# Patient Record
Sex: MALE | Race: BLACK OR AFRICAN AMERICAN
[De-identification: names, ages, dates, MRNs, and addresses within clinical notes are randomized per-mention and may not be internally consistent; named-entity substitution may affect disease eponyms.]

---

## 2022-12-15 ENCOUNTER — RX ONLY (RX ONLY)
Age: 62
End: 2022-12-15

## 2022-12-15 ENCOUNTER — OFFICE (OUTPATIENT)
Dept: URBAN - METROPOLITAN AREA CLINIC 102 | Facility: CLINIC | Age: 62
Setting detail: OPHTHALMOLOGY
End: 2022-12-15
Payer: COMMERCIAL

## 2022-12-15 DIAGNOSIS — H00.11: ICD-10-CM

## 2022-12-15 DIAGNOSIS — H25.13: ICD-10-CM

## 2022-12-15 DIAGNOSIS — E11.3491: ICD-10-CM

## 2022-12-15 DIAGNOSIS — E11.3412: ICD-10-CM

## 2022-12-15 PROCEDURE — 92134 CPTRZ OPH DX IMG PST SGM RTA: CPT | Performed by: OPHTHALMOLOGY

## 2022-12-15 PROCEDURE — 92014 COMPRE OPH EXAM EST PT 1/>: CPT | Performed by: OPHTHALMOLOGY

## 2022-12-15 ASSESSMENT — REFRACTION_MANIFEST
OD_AXIS: 092
OS_SPHERE: +1.00
OD_CYLINDER: -2.00
OD_SPHERE: +1.00
OD_AXIS: 90
OD_SPHERE: +1.25
OD_VA1: 20/25-2
OS_CYLINDER: -1.75
OD_CYLINDER: -2.00
OS_SPHERE: +1.25
OS_VA1: 20/30-1
OS_AXIS: 088
OS_CYLINDER: -1.50
OS_VA1: 20/40-1
OS_AXIS: 85

## 2022-12-15 ASSESSMENT — REFRACTION_CURRENTRX
OD_CYLINDER: 0.00
OD_SPHERE: +1.50
OS_VPRISM_DIRECTION: SV
OS_SPHERE: +1.50
OD_AXIS: 180
OS_OVR_VA: 20/
OD_VPRISM_DIRECTION: SV
OD_OVR_VA: 20/
OS_CYLINDER: 0.00
OS_AXIS: 180

## 2022-12-15 ASSESSMENT — VISUAL ACUITY
OD_BCVA: 20/60-2
OS_BCVA: 20/50-1

## 2022-12-15 ASSESSMENT — AXIALLENGTH_DERIVED
OS_AL: 23.5891
OS_AL: 23.4439
OD_AL: 23.455
OS_AL: 23.4439
OD_AL: 23.3591
OD_AL: 23.3591

## 2022-12-15 ASSESSMENT — REFRACTION_AUTOREFRACTION
OS_AXIS: 088
OD_SPHERE: +1.25
OS_CYLINDER: -1.50
OS_SPHERE: +1.25
OD_AXIS: 092
OD_CYLINDER: -2.00

## 2022-12-15 ASSESSMENT — SPHEQUIV_DERIVED
OS_SPHEQUIV: 0.5
OD_SPHEQUIV: 0.25
OS_SPHEQUIV: 0.5
OD_SPHEQUIV: 0
OS_SPHEQUIV: 0.125
OD_SPHEQUIV: 0.25

## 2022-12-15 ASSESSMENT — KERATOMETRY
METHOD_AUTO_MANUAL: AUTO
OD_AXISANGLE_DEGREES: 008
OS_K2POWER_DIOPTERS: 44.00
OD_K2POWER_DIOPTERS: 44.75
OD_K1POWER_DIOPTERS: 43.00
OS_AXISANGLE_DEGREES: 179
OS_K1POWER_DIOPTERS: 42.75

## 2022-12-15 ASSESSMENT — CONFRONTATIONAL VISUAL FIELD TEST (CVF)
OS_FINDINGS: FULL
OD_FINDINGS: FULL

## 2022-12-15 ASSESSMENT — TONOMETRY
OD_IOP_MMHG: 16
OS_IOP_MMHG: 19

## 2023-04-19 ENCOUNTER — EMERGENCY (EMERGENCY)
Facility: HOSPITAL | Age: 63
LOS: 0 days | Discharge: HOME | End: 2023-04-19
Attending: EMERGENCY MEDICINE
Payer: COMMERCIAL

## 2023-04-19 VITALS — WEIGHT: 177.91 LBS | HEIGHT: 65 IN

## 2023-04-19 VITALS — SYSTOLIC BLOOD PRESSURE: 158 MMHG | DIASTOLIC BLOOD PRESSURE: 103 MMHG

## 2023-04-19 DIAGNOSIS — M54.50 LOW BACK PAIN, UNSPECIFIED: ICD-10-CM

## 2023-04-19 DIAGNOSIS — E11.9 TYPE 2 DIABETES MELLITUS WITHOUT COMPLICATIONS: ICD-10-CM

## 2023-04-19 DIAGNOSIS — I10 ESSENTIAL (PRIMARY) HYPERTENSION: ICD-10-CM

## 2023-04-19 DIAGNOSIS — M62.830 MUSCLE SPASM OF BACK: ICD-10-CM

## 2023-04-19 PROCEDURE — 96372 THER/PROPH/DIAG INJ SC/IM: CPT

## 2023-04-19 PROCEDURE — 99284 EMERGENCY DEPT VISIT MOD MDM: CPT

## 2023-04-19 PROCEDURE — 99283 EMERGENCY DEPT VISIT LOW MDM: CPT | Mod: 25

## 2023-04-19 RX ORDER — LIDOCAINE 4 G/100G
1 CREAM TOPICAL ONCE
Refills: 0 | Status: COMPLETED | OUTPATIENT
Start: 2023-04-19 | End: 2023-04-19

## 2023-04-19 RX ORDER — KETOROLAC TROMETHAMINE 30 MG/ML
30 SYRINGE (ML) INJECTION ONCE
Refills: 0 | Status: DISCONTINUED | OUTPATIENT
Start: 2023-04-19 | End: 2023-04-19

## 2023-04-19 RX ORDER — LIDOCAINE 4 G/100G
1 CREAM TOPICAL
Qty: 2 | Refills: 0
Start: 2023-04-19

## 2023-04-19 RX ORDER — CYCLOBENZAPRINE HYDROCHLORIDE 10 MG/1
1 TABLET, FILM COATED ORAL
Qty: 30 | Refills: 0
Start: 2023-04-19

## 2023-04-19 RX ORDER — CYCLOBENZAPRINE HYDROCHLORIDE 10 MG/1
10 TABLET, FILM COATED ORAL ONCE
Refills: 0 | Status: COMPLETED | OUTPATIENT
Start: 2023-04-19 | End: 2023-04-19

## 2023-04-19 RX ADMIN — LIDOCAINE 1 PATCH: 4 CREAM TOPICAL at 21:59

## 2023-04-19 RX ADMIN — CYCLOBENZAPRINE HYDROCHLORIDE 10 MILLIGRAM(S): 10 TABLET, FILM COATED ORAL at 22:01

## 2023-04-19 RX ADMIN — Medication 30 MILLIGRAM(S): at 22:02

## 2023-04-19 NOTE — ED STATDOCS - CLINICAL SUMMARY MEDICAL DECISION MAKING FREE TEXT BOX
Pt with atraumatic bilateral lower back pain, positional. Neuro intact. No red flags. Plan: pain control and muscle relaxers. Stable for outpatient PCP follow-up.

## 2023-04-19 NOTE — ED ADULT TRIAGE NOTE - CHIEF COMPLAINT QUOTE
Ambulatory to ER with c/o lower back pain x 2 days. Patient denies injury to area pain meds taken prior to arrival with no relief.

## 2023-04-19 NOTE — ED STATDOCS - PHYSICAL EXAMINATION
General: AAOx3, NAD  HEENT: NCAT  Cardiac: Normal rate and rhythm, no murmurs, normal peripheral perfusion  Respiratory: Normal rate and effort. CTAB  GI: Soft, nondistended, nontender  Neuro: No focal deficits. BARRIENTOS equally x4, sensation to light touch intact throughout  MSK: Normal straight leg raise and FROMx4, no focal bony tenderness, no peripheral edema  Skin: No rash

## 2023-04-19 NOTE — ED STATDOCS - OBJECTIVE STATEMENT
62 year old male with PMHx of DM and HTN presents to the ED complaining of bilateral lower back pain x 2-3 days. Pain exacerbated with standing and movement. Denies pain radiation down his legs, incontinence, numbness, weakness, tingling, saddle anesthesia, or any other complaints. Denies any trauma or recent heavy lifting, only endorses mowing the lawn recently. Took extra strength Excedrin today.

## 2023-04-19 NOTE — ED STATDOCS - PATIENT PORTAL LINK FT
You can access the FollowMyHealth Patient Portal offered by [facility] by registering at the following website: [email or web address]. By joining [employer]’s FollowMyHealth portal, you will also be able to view your health information using other applications (apps) compatible with our system.

## 2023-04-19 NOTE — ED STATDOCS - NSFOLLOWUPINSTRUCTIONS_ED_ALL_ED_FT
Return to the Emergency Department for worsening or persistent symptoms, and/or ANY NEW OR CONCERNING SYMPTOMS. If you have issues obtaining follow up, please call: [phone] or [phone]  to obtain a doctor or specialist who takes your insurance in your area.    Acute Back Pain, Adult  Acute back pain is sudden and usually short-lived. It is often caused by an injury to the muscles and tissues in the back. The injury may result from:  A muscle, tendon, or ligament getting overstretched or torn. Ligaments are tissues that connect bones to each other. Lifting something improperly can cause a back strain.  Wear and tear (degeneration) of the spinal disks. Spinal disks are circular tissue that provide cushioning between the bones of the spine (vertebrae).  Twisting motions, such as while playing sports or doing yard work.  A hit to the back.  Arthritis.  You may have a physical exam, lab tests, and imaging tests to find the cause of your pain. Acute back pain usually goes away with rest and home care.    Follow these instructions at home:  Managing pain, stiffness, and swelling    Take over-the-counter and prescription medicines only as told by your health care provider. Treatment may include medicines for pain and inflammation that are taken by mouth or applied to the skin, or muscle relaxants.  Your health care provider may recommend applying ice during the first 24–48 hours after your pain starts. To do this:  Put ice in a plastic bag.  Place a towel between your skin and the bag.  Leave the ice on for 20 minutes, 2–3 times a day.  Remove the ice if your skin turns bright red. This is very important. If you cannot feel pain, heat, or cold, you have a greater risk of damage to the area.  If directed, apply heat to the affected area as often as told by your health care provider. Use the heat source that your health care provider recommends, such as a moist heat pack or a heating pad.  Place a towel between your skin and the heat source.  Leave the heat on for 20–30 minutes.  Remove the heat if your skin turns bright red. This is especially important if you are unable to feel pain, heat, or cold. You have a greater risk of getting burned.  Activity    Comparisons of good and bad posture while driving, standing, sitting at a desk, and lifting heavy objects.  Do not stay in bed. Staying in bed for more than 1–2 days can delay your recovery.  Sit up and stand up straight. Avoid leaning forward when you sit or hunching over when you stand.  If you work at a desk, sit close to it so you do not need to lean over. Keep your chin tucked in. Keep your neck drawn back, and keep your elbows bent at a 90-degree angle (right angle).  Sit high and close to the steering wheel when you drive. Add lower back (lumbar) support to your car seat, if needed.  Take short walks on even surfaces as soon as you are able. Try to increase the length of time you walk each day.  Do not sit, drive, or  one place for more than 30 minutes at a time. Sitting or standing for long periods of time can put stress on your back.  Do not drive or use heavy machinery while taking prescription pain medicine.  Use proper lifting techniques. When you bend and lift, use positions that put less stress on your back:  Bend your knees.  Keep the load close to your body.  Avoid twisting.  Exercise regularly as told by your health care provider. Exercising helps your back heal faster and helps prevent back injuries by keeping muscles strong and flexible.  Work with a physical therapist to make a safe exercise program, as recommended by your health care provider. Do any exercises as told by your physical therapist.  Lifestyle    Maintain a healthy weight. Extra weight puts stress on your back and makes it difficult to have good posture.  Avoid activities or situations that make you feel anxious or stressed. Stress and anxiety increase muscle tension and can make back pain worse. Learn ways to manage anxiety and stress, such as through exercise.  General instructions    Sleep on a firm mattress in a comfortable position. Try lying on your side with your knees slightly bent. If you lie on your back, put a pillow under your knees.  Keep your head and neck in a straight line with your spine (neutral position) when using electronic equipment like smartphones or pads. To do this:  Raise your smartphone or pad to look at it instead of bending your head or neck to look down.  Put the smartphone or pad at the level of your face while looking at the screen.  Follow your treatment plan as told by your health care provider. This may include:  Cognitive or behavioral therapy.  Acupuncture or massage therapy.  Meditation or yoga.  Contact a health care provider if:  You have pain that is not relieved with rest or medicine.  You have increasing pain going down into your legs or buttocks.  Your pain does not improve after 2 weeks.  You have pain at night.  You lose weight without trying.  You have a fever or chills.  You develop nausea or vomiting.  You develop abdominal pain.  Get help right away if:  You develop new bowel or bladder control problems.  You have unusual weakness or numbness in your arms or legs.  You feel faint.  These symptoms may represent a serious problem that is an emergency. Do not wait to see if the symptoms will go away. Get medical help right away. Call your local emergency services (911 in the [place] Do not drive yourself to the hospital.    Summary  Acute back pain is sudden and usually short-lived.  Use proper lifting techniques. When you bend and lift, use positions that put less stress on your back.  Take over-the-counter and prescription medicines only as told by your health care provider, and apply heat or ice as told.  This information is not intended to replace advice given to you by your health care provider. Make sure you discuss any questions you have with your health care provider.

## 2023-04-19 NOTE — ED ADULT NURSE NOTE - OBJECTIVE STATEMENT
61 y/o male awake alert and oriented x4 presents to ED c/o b/l lower back pain x 2-3 days. Pt worse with standing and movement. Denies pain radiating down his legs, numbness or tingling sensation. Denies any trauma or heavy lifting.

## 2023-04-24 ENCOUNTER — EMERGENCY (EMERGENCY)
Facility: HOSPITAL | Age: 63
LOS: 0 days | Discharge: HOME | End: 2023-04-24
Attending: EMERGENCY MEDICINE
Payer: COMMERCIAL

## 2023-04-24 VITALS
SYSTOLIC BLOOD PRESSURE: 157 MMHG | DIASTOLIC BLOOD PRESSURE: 82 MMHG | OXYGEN SATURATION: 98 % | WEIGHT: 177.91 LBS | TEMPERATURE: 98 F | HEART RATE: 73 BPM | RESPIRATION RATE: 18 BRPM | HEIGHT: 65 IN

## 2023-04-24 VITALS
OXYGEN SATURATION: 100 % | TEMPERATURE: 99 F | RESPIRATION RATE: 16 BRPM | DIASTOLIC BLOOD PRESSURE: 90 MMHG | HEART RATE: 72 BPM | SYSTOLIC BLOOD PRESSURE: 154 MMHG

## 2023-04-24 DIAGNOSIS — M25.552 PAIN IN LEFT HIP: ICD-10-CM

## 2023-04-24 DIAGNOSIS — R10.9 UNSPECIFIED ABDOMINAL PAIN: ICD-10-CM

## 2023-04-24 DIAGNOSIS — R29.91 UNSPECIFIED SYMPTOMS AND SIGNS INVOLVING THE MUSCULOSKELETAL SYSTEM: ICD-10-CM
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
## 2023-04-24 DIAGNOSIS — M54.50 LOW BACK PAIN, UNSPECIFIED: ICD-10-CM
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
## 2023-04-24 DIAGNOSIS — Z79.84 LONG TERM (CURRENT) USE OF ORAL HYPOGLYCEMIC DRUGS: ICD-10-CM

## 2023-04-24 DIAGNOSIS — M25.551 PAIN IN RIGHT HIP: ICD-10-CM
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
## 2023-04-24 DIAGNOSIS — S32.10XA UNSPECIFIED FRACTURE OF SACRUM, INITIAL ENCOUNTER FOR CLOSED FRACTURE: ICD-10-CM

## 2023-04-24 LAB
ALBUMIN SERPL ELPH-MCNC: 3.6 G/DL — SIGNIFICANT CHANGE UP (ref 3.3–5)
ALP SERPL-CCNC: 97 U/L — SIGNIFICANT CHANGE UP (ref 40–120)
ALT FLD-CCNC: 30 U/L — SIGNIFICANT CHANGE UP (ref 12–78)
APPEARANCE UR: CLEAR — SIGNIFICANT CHANGE UP
AST SERPL-CCNC: 18 U/L — SIGNIFICANT CHANGE UP (ref 15–37)
BACTERIA # UR AUTO: NEGATIVE — SIGNIFICANT CHANGE UP
BASOPHILS # BLD AUTO: 0.04 K/UL — SIGNIFICANT CHANGE UP (ref 0–0.2)
BASOPHILS NFR BLD AUTO: 0.8 % — SIGNIFICANT CHANGE UP (ref 0–2)
BILIRUB SERPL-MCNC: 0.6 MG/DL — SIGNIFICANT CHANGE UP (ref 0.2–1.2)
BILIRUB UR-MCNC: NEGATIVE — SIGNIFICANT CHANGE UP
BUN SERPL-MCNC: 15 MG/DL — SIGNIFICANT CHANGE UP (ref 7–23)
CALCIUM SERPL-MCNC: 8.8 MG/DL — SIGNIFICANT CHANGE UP (ref 8.5–10.1)
CHLORIDE SERPL-SCNC: 106 MMOL/L — SIGNIFICANT CHANGE UP (ref 96–108)
CO2 SERPL-SCNC: 33 MMOL/L — HIGH (ref 22–31)
COLOR SPEC: YELLOW — SIGNIFICANT CHANGE UP
CREAT SERPL-MCNC: 0.96 MG/DL — SIGNIFICANT CHANGE UP (ref 0.5–1.3)
CRP SERPL-MCNC: <3 MG/L — SIGNIFICANT CHANGE UP
DIFF PNL FLD: ABNORMAL
EGFR: 90 ML/MIN/1.73M2 — SIGNIFICANT CHANGE UP
EOSINOPHIL # BLD AUTO: 0.09 K/UL — SIGNIFICANT CHANGE UP (ref 0–0.5)
EOSINOPHIL NFR BLD AUTO: 1.7 % — SIGNIFICANT CHANGE UP (ref 0–6)
EPI CELLS # UR: SIGNIFICANT CHANGE UP
ERYTHROCYTE [SEDIMENTATION RATE] IN BLOOD: 8 MM/HR — SIGNIFICANT CHANGE UP (ref 0–20)
GLUCOSE SERPL-MCNC: 252 MG/DL — HIGH (ref 70–99)
GLUCOSE UR QL: 1000 MG/DL
HCT VFR BLD CALC: 42.6 % — SIGNIFICANT CHANGE UP (ref 39–50)
HGB BLD-MCNC: 13.7 G/DL — SIGNIFICANT CHANGE UP (ref 13–17)
IMM GRANULOCYTES NFR BLD AUTO: 0.2 % — SIGNIFICANT CHANGE UP (ref 0–0.9)
KETONES UR-MCNC: ABNORMAL
LEUKOCYTE ESTERASE UR-ACNC: NEGATIVE — SIGNIFICANT CHANGE UP
LYMPHOCYTES # BLD AUTO: 1.74 K/UL — SIGNIFICANT CHANGE UP (ref 1–3.3)
LYMPHOCYTES # BLD AUTO: 32.6 % — SIGNIFICANT CHANGE UP (ref 13–44)
MCHC RBC-ENTMCNC: 28.5 PG — SIGNIFICANT CHANGE UP (ref 27–34)
MCHC RBC-ENTMCNC: 32.2 GM/DL — SIGNIFICANT CHANGE UP (ref 32–36)
MCV RBC AUTO: 88.6 FL — SIGNIFICANT CHANGE UP (ref 80–100)
MONOCYTES # BLD AUTO: 0.53 K/UL — SIGNIFICANT CHANGE UP (ref 0–0.9)
MONOCYTES NFR BLD AUTO: 9.9 % — SIGNIFICANT CHANGE UP (ref 2–14)
NEUTROPHILS # BLD AUTO: 2.92 K/UL — SIGNIFICANT CHANGE UP (ref 1.8–7.4)
NEUTROPHILS NFR BLD AUTO: 54.8 % — SIGNIFICANT CHANGE UP (ref 43–77)
NITRITE UR-MCNC: NEGATIVE — SIGNIFICANT CHANGE UP
PH UR: 5 — SIGNIFICANT CHANGE UP (ref 5–8)
PLATELET # BLD AUTO: 212 K/UL — SIGNIFICANT CHANGE UP (ref 150–400)
POTASSIUM SERPL-MCNC: 4.1 MMOL/L — SIGNIFICANT CHANGE UP (ref 3.5–5.3)
POTASSIUM SERPL-SCNC: 4.1 MMOL/L — SIGNIFICANT CHANGE UP (ref 3.5–5.3)
PROT SERPL-MCNC: 8 GM/DL — SIGNIFICANT CHANGE UP (ref 6–8.3)
PROT UR-MCNC: 15
RBC # BLD: 4.81 M/UL — SIGNIFICANT CHANGE UP (ref 4.2–5.8)
RBC # FLD: 13 % — SIGNIFICANT CHANGE UP (ref 10.3–14.5)
RBC CASTS # UR COMP ASSIST: SIGNIFICANT CHANGE UP /HPF (ref 0–4)
SODIUM SERPL-SCNC: 138 MMOL/L — SIGNIFICANT CHANGE UP (ref 135–145)
SP GR SPEC: 1.02 — SIGNIFICANT CHANGE UP (ref 1.01–1.02)
UROBILINOGEN FLD QL: NEGATIVE — SIGNIFICANT CHANGE UP
WBC # BLD: 5.33 K/UL — SIGNIFICANT CHANGE UP (ref 3.8–10.5)
WBC # FLD AUTO: 5.33 K/UL — SIGNIFICANT CHANGE UP (ref 3.8–10.5)
WBC UR QL: SIGNIFICANT CHANGE UP /HPF (ref 0–5)

## 2023-04-24 PROCEDURE — 81001 URINALYSIS AUTO W/SCOPE: CPT

## 2023-04-24 PROCEDURE — 85652 RBC SED RATE AUTOMATED: CPT

## 2023-04-24 PROCEDURE — 85025 COMPLETE CBC W/AUTO DIFF WBC: CPT

## 2023-04-24 PROCEDURE — 74177 CT ABD & PELVIS W/CONTRAST: CPT | Mod: ME

## 2023-04-24 PROCEDURE — 86140 C-REACTIVE PROTEIN: CPT

## 2023-04-24 PROCEDURE — G1004: CPT

## 2023-04-24 PROCEDURE — 99284 EMERGENCY DEPT VISIT MOD MDM: CPT

## 2023-04-24 PROCEDURE — 80053 COMPREHEN METABOLIC PANEL: CPT

## 2023-04-24 PROCEDURE — 36415 COLL VENOUS BLD VENIPUNCTURE: CPT

## 2023-04-24 PROCEDURE — 96374 THER/PROPH/DIAG INJ IV PUSH: CPT | Mod: XU

## 2023-04-24 PROCEDURE — 99284 EMERGENCY DEPT VISIT MOD MDM: CPT | Mod: 25

## 2023-04-24 PROCEDURE — 71250 CT THORAX DX C-: CPT | Mod: 26,MG

## 2023-04-24 PROCEDURE — 74177 CT ABD & PELVIS W/CONTRAST: CPT | Mod: 26,ME

## 2023-04-24 PROCEDURE — 96361 HYDRATE IV INFUSION ADD-ON: CPT

## 2023-04-24 PROCEDURE — 71250 CT THORAX DX C-: CPT | Mod: MG

## 2023-04-24 RX ORDER — KETOROLAC TROMETHAMINE 30 MG/ML
30 SYRINGE (ML) INJECTION ONCE
Refills: 0 | Status: DISCONTINUED | OUTPATIENT
Start: 2023-04-24 | End: 2023-04-24

## 2023-04-24 RX ORDER — SODIUM CHLORIDE 9 MG/ML
1000 INJECTION INTRAMUSCULAR; INTRAVENOUS; SUBCUTANEOUS ONCE
Refills: 0 | Status: COMPLETED | OUTPATIENT
Start: 2023-04-24 | End: 2023-04-24

## 2023-04-24 RX ORDER — METHOCARBAMOL 500 MG/1
2 TABLET, FILM COATED ORAL
Qty: 24 | Refills: 0
Start: 2023-04-24 | End: 2023-04-27

## 2023-04-24 RX ADMIN — SODIUM CHLORIDE 1000 MILLILITER(S): 9 INJECTION INTRAMUSCULAR; INTRAVENOUS; SUBCUTANEOUS at 11:10

## 2023-04-24 RX ADMIN — SODIUM CHLORIDE 1000 MILLILITER(S): 9 INJECTION INTRAMUSCULAR; INTRAVENOUS; SUBCUTANEOUS at 12:11

## 2023-04-24 RX ADMIN — Medication 30 MILLIGRAM(S): at 15:37

## 2023-04-24 NOTE — ED ADULT NURSE NOTE - NSIMPLEMENTINTERV_GEN_ALL_ED
Implemented All Universal Safety Interventions:  [place] to call system. Call bell, personal items and telephone within reach. Instruct patient to call for assistance. Room bathroom lighting operational. Non-slip footwear when patient is off stretcher. Physically safe environment: no spills, clutter or unnecessary equipment. Stretcher in lowest position, wheels locked, appropriate side rails in place.

## 2023-04-24 NOTE — ED STATDOCS - ATTENDING APP SHARED VISIT CONTRIBUTION OF CARE
I personally saw the patient with the FREDO, and completed the key components of the history and physical exam. I then discussed the management plan with the FREDO.

## 2023-04-24 NOTE — ED STATDOCS - NS ED ATTENDING STATEMENT MOD
This was a shared visit with the FREDO. I reviewed and verified the documentation and independently performed the documented:

## 2023-04-24 NOTE — ED STATDOCS - CLINICAL SUMMARY MEDICAL DECISION MAKING FREE TEXT BOX
Pt with PMHx of DM presents c/o abd pain 4 days after previous visit without improvement. Will check labs, CT scan, urine. Most likely MSK, but concerning for intraabdominal pathology.

## 2023-04-24 NOTE — ED ADULT NURSE NOTE - OBJECTIVE STATEMENT
Pt. presents to the ER c/o bilateral hip pain that worsens with activity, that started a couple of weeks ago. Pt is ambulatory but c/o numbness to bilateral feet due to diabetes. Pt. denies any injury to hip. Pt. was seen here in the ER and was treated for muscle spasm and took Motrin at home with some relief.

## 2023-04-24 NOTE — CONSULT NOTE ADULT - SUBJECTIVE AND OBJECTIVE BOX
62M presents to [facility] for evaluation of persistent lower back pain. Patient reports that he has had 1-2 weeks of low back pain that began after mowing the lawn. He describes the pain as sharp and radiating from his low back/sides around to his lower abdomen. He states that he feels the pain is "deep in his abdomen". He states that he has a difficult time getting out of bed due to the pain. He has been able to ambulate but says that he moves slowly and is hunched over secondary to pain. He denies any history of falls, trauma, heavy lifting, or other strenuous activity besides for mowing the lawn. He was evaluated here on 4/19 and diagnosed with muscle strain and discharged with Flexeril for muscle spasm and outpatient follow up. Patient returns today as pain has not resolved. Otherwise denies numbness/tingling (aside from his unchanged baseline diabetic neuropathy), weakness, change in bowel/bladder. Denies history of malignancy.     Vital Signs Last 24 Hrs  T(C): 36.8 (24 Apr 2023 10:23), Max: 36.8 (24 Apr 2023 10:23)  T(F): 98.2 (24 Apr 2023 10:23), Max: 98.2 (24 Apr 2023 10:23)  HR: 73 (24 Apr 2023 10:23) (73 - 73)  BP: 157/82 (24 Apr 2023 10:23) (157/82 - 157/82)  BP(mean): 99 (24 Apr 2023 10:23) (99 - 99)  RR: 18 (24 Apr 2023 10:23) (18 - 18)  SpO2: 98% (24 Apr 2023 10:23) (98% - 98%)    Parameters below as of 24 Apr 2023 10:23  Patient On (Oxygen Delivery Method): room air                          13.7   5.33  )-----------( 212      ( 24 Apr 2023 11:00 )             42.6       Exam:  General: Awake alert pleasant no acute distress, sitting in chair  Spine:   Skin intact. No abrasions/lacerations appreciated. No gross deformity.   No reproducible C/T/LSpine TTP. No palpable step off or deformity appreciated  Able to SLR bilaterally without reproduction of symptoms  Negative Babinski; Negative Clonus  DP/Radial pulses palpable  Compartments soft and compressible  No calf TTP bilaterally    Motor:                   C5                C6              C7               C8           T1   R            5/5                5/5            5/5             5/5          5/5  L             5/5               5/5             5/5             5/5          5/5                L2             L3             L4               L5            S1  R         5/5           5/5          5/5             5/5           5/5  L          5/5          5/5           5/5             5/5           5/5    Sensory:            C5         C6         C7      C8       T1        (0=absent, 1=impaired, 2=normal, NT=not testable)  R         2            2           2        2         2  L          2            2           2        2         2               L2          L3         L4      L5       S1         (0=absent, 1=impaired, 2=normal, NT=not testable)  R         2            2            2        2        2  L          2            2           2        2         2      CT imaging reviewed with S1 fracture with callus/evidence of healing      A/P:  62M with S1 fracture, subacute with evidence of healing    Plan:  CT imaging reviewed with above findings appreciated, no evidence of malignancy/lesion noted on CT A/P  Discussed with patient and wife at bedside regarding fracture which is atypical without prior trauma, but patient and wife continue to deny any falls/trauma aside from mowing the lawn a week ago. Discussed need to get CT imaging of his chest to ensure no malignancy that may have precipitated fracture, which patient and wife agreeable  CT Chest ordered to r/o pulmonary malignancy - if concerning findings noted would defer management to oncology/primary teams for further workup and would continue to follow  Pain control PRN  Recommend continued muscle relaxants for spasm  WBAT with assistive devices as needed  If no evidence of malignancy on CT chest, no acute orthopedic intervention indicated and further workup (possible MRI) can be performed as outpatient after follow up with spine surgeon Dr. Daley. Patient/wife agreeable to plan.   Discussed with attending Dr. Daley who agrees with plan

## 2023-04-24 NOTE — ED STATDOCS - OBJECTIVE STATEMENT
63 y/o male with PMHx of DM on Metformin presents to the ED c/o 1 week of b/l hip pain, abd pain and lower back pain. States he was here a week ago and was told it was muscle spasms, states he was given muscle relaxers and has been taking Motrin, but pain has not been improving. States he has pain even when sitting still. Denies any recent trauma. NKDA. Pt states he has been eating normally, denies any urinary symptoms. Denies any hx of abd surgery. Pain does not radiate to his testicles.

## 2023-04-24 NOTE — ED STATDOCS - NS ED SCRIBE STATEMENT
Detail Level: Zone Hide Include Location In Plan Question?: No Include Location In Plan?: Yes Attending

## 2023-04-24 NOTE — ED ADULT TRIAGE NOTE - CHIEF COMPLAINT QUOTE
Pt arrives to ED complaining of non traumatic b/l hip, lower back pain. Pt seen and d/c from  ED last week with same symptoms. Hx DM.

## 2023-04-24 NOTE — ED STATDOCS - PROGRESS NOTE DETAILS
patient seen and evaluated with ED attending at intake initially.  reporting low back pain, not improved s/p treatment from visit a few days ago.  CT ordered for further eval.  +S1 fracture, healing.  d/w Dr. Daley on call for spine.  He is concerned that the fracture is atraumatic.  CT chest ordered to further eval for possible malignancy.  No acute findings.  He will follow up with Dr. Daley in the office.  Strict return precautions reviewed -Keven Bowser PA-C

## 2023-04-24 NOTE — ED STATDOCS - NSFOLLOWUPINSTRUCTIONS_ED_ALL_ED_FT
Lumbar Spine Fracture  Side view of the spine in a person's body, with a close-up of a fracture in the lumbar spine.  A lumbar spine fracture is a break in one of the bones of the lower back. Lumbar spine fractures can vary from mild to severe. The most severe types are those that:  Cause the broken bones to move out of place (unstable).  Injure or press on the spinal cord.  During recovery, it is normal to have pain and stiffness in the lower back for weeks.    What are the causes?  This condition may be caused by:  A fall.  A car accident.  A gunshot wound.  A hard, direct hit to the back.  What increases the risk?  You are more likely to develop this condition if:  You are in a situation that could result in a fall or other violent injury.  You have a condition that causes weakness in the bones (osteoporosis).  What are the signs or symptoms?  The main symptom of this condition is severe pain in the lower back. If a fracture is complex or severe, there may also be:  A misshapen or swollen area on the lower back.  Limited ability to move an area of the lower back.  Inability to empty the bladder (urinary retention).  Loss of bowel or bladder control (incontinence).  Loss of strength or sensation in the legs, feet, and toes.  Inability to move (paralysis).  How is this diagnosed?  This condition is diagnosed based on:  A physical exam.  Symptoms and what happened just before they developed.  The results of imaging tests, such as an X-ray, CT scan, or MRI.  If your nerves have been damaged, you may also have other tests to find out the extent of the damage.    How is this treated?  Treatment for this condition depends on how severe the injury is. Most fractures can be treated with:  A back brace.  Bed rest and activity restrictions.  Pain medicine.  Physical therapy.  Fractures that are complex, involve multiple bones, or make the spine unstable may require surgery. Surgery is done:  To remove pressure from the nerves or spinal cord.  To stabilize the broken pieces of bone.  Follow these instructions at home:  Medicines    Take over-the-counter and prescription medicines only as told by your health care provider.  Do not drive or use heavy machinery while taking prescription pain medicine.  If you are taking prescription pain medicine, take actions to prevent or treat constipation. Your health care provider may recommend that you:  Drink enough fluid to keep your urine pale yellow.  Eat foods that are high in fiber, such as fresh fruits and vegetables, whole grains, and beans.  Limit foods that are high in fat and processed sugars, such as fried or sweet foods.  Take an over-the-counter or prescription medicine for constipation.  If you have a brace:    Wear the back brace as told by your health care provider. Remove it only as told by your health care provider.  Keep the brace clean.  If the brace is not waterproof:  Do not let it get wet.  Cover it with a watertight covering when you take a bath or a shower.  Activity    Stay in bed (on bed rest) only as directed by your health care provider.  Do exercises to improve motion and strength in your back (physical therapy), if your health care provider tells you to do so.  Return to your normal activities as directed by your health care provider. Ask your health care provider what activities are safe for you.  Managing pain, stiffness, and swelling    Bag of ice on a towel on the skin.  If directed, put ice on the injured area:  If you have a removable brace, remove it as told by your health care provider.  Put ice in a plastic bag.  Place a towel between your skin and the bag.  Leave the ice on for 20 minutes, 2–3 times a day.  General instructions    Do not use any products that contain nicotine or tobacco, such as cigarettes and e-cigarettes. These can delay healing after injury. If you need help quitting, ask your health care provider.  Do not drink alcohol. Alcohol can interfere with your treatment.  Keep all follow-up visits as directed by your health care provider. This is important.  Failing to follow up as recommended could result in permanent injury, disability, or long-lasting (chronic) pain.  Contact a health care provider if:  You have a fever.  Your pain medicine is not helping.  Your pain does not get better over time.  You cannot return to your normal activities as planned or expected.  Get help right away if:  You have difficulty breathing.  Your pain is very bad and it suddenly gets worse.  You have numbness, tingling, or weakness in any part of your body.  You are unable to empty your bladder.  You cannot control your bladder or bowels.  You are unable to move any body part (paralysis) that is below the level of your injury.  You vomit.  You have pain in your abdomen.  Summary  A lumbar spine fracture is a break in one of the bones of the lower back.  The main symptom of this condition is severe pain in the lower back. If a fracture is complex, there may also be numbness, tingling, or paralysis in the legs.  Treatment depends on how severe the injury is. Most fractures can be treated with a back brace, bed rest and activity restrictions, pain medicine, and physical therapy.  Fractures that are complex, involve multiple bones, or make the spine unstable may require surgery.  This information is not intended to replace advice given to you by your health care provider. Make sure you discuss any questions you have with your health care provider.

## 2023-04-24 NOTE — ED STATDOCS - PATIENT PORTAL LINK FT
You can access the FollowMyHealth Patient Portal offered by [facility] by registering at the following website: [email or web address]. By joining [employer]’s FollowMyHealth portal, you will also be able to view your health information using other applications (apps) compatible with our system.

## 2023-04-24 NOTE — ED STATDOCS - CARE PROVIDER_API CALL
Ben Daley (DO)  Orthopaedic Surgery Surgery  [address], [address] [place] [address]  Phone: [phone]  Fax: [phone]  Follow Up Time:

## 2023-12-21 ENCOUNTER — OFFICE (OUTPATIENT)
Dept: URBAN - METROPOLITAN AREA CLINIC 102 | Facility: CLINIC | Age: 63
Setting detail: OPHTHALMOLOGY
End: 2023-12-21
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
## 2023-12-21 PROCEDURE — NO SHOW FE NO SHOW FEE: Performed by: OPHTHALMOLOGY

## 2025-03-02 ENCOUNTER — EMERGENCY (EMERGENCY)
Facility: HOSPITAL | Age: 65
LOS: 0 days | Discharge: HOME | End: 2025-03-02
Attending: STUDENT IN AN ORGANIZED HEALTH CARE EDUCATION/TRAINING PROGRAM
Payer: COMMERCIAL

## 2025-03-02 VITALS
DIASTOLIC BLOOD PRESSURE: 85 MMHG | HEART RATE: 88 BPM | RESPIRATION RATE: 17 BRPM | TEMPERATURE: 99 F | OXYGEN SATURATION: 98 % | SYSTOLIC BLOOD PRESSURE: 138 MMHG

## 2025-03-02 VITALS — WEIGHT: 182.98 LBS

## 2025-03-02 DIAGNOSIS — R51.9 HEADACHE, UNSPECIFIED: ICD-10-CM

## 2025-03-02 DIAGNOSIS — B34.9 VIRAL INFECTION, UNSPECIFIED: ICD-10-CM

## 2025-03-02 DIAGNOSIS — I10 ESSENTIAL (PRIMARY) HYPERTENSION: ICD-10-CM

## 2025-03-02 DIAGNOSIS — Z79.84 LONG TERM (CURRENT) USE OF ORAL HYPOGLYCEMIC DRUGS: ICD-10-CM

## 2025-03-02 DIAGNOSIS — E11.9 TYPE 2 DIABETES MELLITUS WITHOUT COMPLICATIONS: ICD-10-CM

## 2025-03-02 DIAGNOSIS — Z79.85 LONG-TERM (CURRENT) USE OF INJECTABLE NON-INSULIN ANTIDIABETIC DRUGS: ICD-10-CM

## 2025-03-02 PROCEDURE — 82962 GLUCOSE BLOOD TEST: CPT

## 2025-03-02 PROCEDURE — 99282 EMERGENCY DEPT VISIT SF MDM: CPT

## 2025-03-02 PROCEDURE — 99283 EMERGENCY DEPT VISIT LOW MDM: CPT

## 2025-03-02 RX ORDER — ACETAMINOPHEN 500 MG/5ML
975 LIQUID (ML) ORAL ONCE
Refills: 0 | Status: COMPLETED | OUTPATIENT
Start: 2025-03-02 | End: 2025-03-02

## 2025-03-02 RX ADMIN — Medication 975 MILLIGRAM(S): at 21:48

## 2025-03-02 NOTE — ED STATDOCS - PATIENT PORTAL LINK FT
You can access the FollowMyHealth Patient Portal offered by [facility] by registering at the following website: [email or web address]. By joining [employer]’s FollowMyHealth portal, you will also be able to view your health information using other applications (apps) compatible with our system.

## 2025-03-02 NOTE — ED STATDOCS - OBJECTIVE STATEMENT
63 y/o M with PMHx of DM2 on Metformin and Trulicity, HTN presents to the ED c/o flu like sx for 1 week. Endorses HA, sinus pressure, throat pain, and body aches. +decreased PO intake. Denies fevers, cough. Pt used a nasal spray and NyQuil last night. Denies taking Tylenol or Motrin. Pt states that he has a child living in the house that attends . Notes that due to feeling off, he has not really been checking his sugars at home. Nonsmoker.

## 2025-03-02 NOTE — ED ADULT NURSE NOTE - NSFALLASSESSNEED_ED_ALL_ED
Pt was contacted and surgery was rescheduled from 4/29/24 to 5/13/24 per the patient's request.   
Wants to talk with someone regarding his surgery  
no

## 2025-03-02 NOTE — ED STATDOCS - CARE PROVIDER_API CALL
Pj Hernandez  Internal Medicine  [address], [address]  [place] [address]  Phone: [phone]  Fax: [phone]  Follow Up Time:

## 2025-03-02 NOTE — ED ADULT TRIAGE NOTE - CHIEF COMPLAINT QUOTE
pt ambulatory to ED for flu like symptoms x2 weeks. endorses weakness, headache, sore throat, and sinus pressure. denies sick contacts. denies chest pain and SOB.

## 2025-03-02 NOTE — ED STATDOCS - PROGRESS NOTE DETAILS
viral syndrome, DC with supportive care at home.  Olga Alicea PA-C 65 y/o M with PMHx of DM2 on Metformin and Trulicity, HTN presents to the ED c/o flu like sx for 1 week. Endorses HA, sinus pressure, throat pain, and body aches. +decreased PO intake. Denies fevers, cough. Pt used a nasal spray and NyQuil last night. Denies taking Tylenol or Motrin. Pt states that he has a child living in the house that attends . Notes that due to feeling off, he has not really been checking his sugars at home. Nonsmoker.  Olga Alicea PA-C

## 2025-03-02 NOTE — ED STATDOCS - CLINICAL SUMMARY MEDICAL DECISION MAKING FREE TEXT BOX
65 y/o M with PMHx of DM2 on Metformin and Trulicity, HTN presents to the ED c/o flu like sx for 1 week. Endorses HA, sinus pressure, throat pain, and body aches. +decreased PO intake. Denies fevers, cough. Pt used a nasal spray and NyQuil last night. Denies taking Tylenol or Motrin. Patient well-appearing, hemodynamically stable with nonfocal exam.  Patient likely with viral syndrome.  Will ensure patient is not hyperglycemic, give Tylenol and discharge.

## 2025-03-02 NOTE — ED STATDOCS - PHYSICAL EXAMINATION
GEN: Patient awake alert NAD.   HEENT: normocephalic, atraumatic, EOMI, no scleral icterus, moist MM  CARDIAC: RRR, S1, S2, no murmur.   PULM: CTA B/L no wheeze, rhonchi, rales.   ABD: soft NT, ND, no rebound no guarding, no CVA tenderness.   MSK: Moving all extremities, no edema. 5/5 strength and full ROM in all extremities.     NEURO: A&Ox3, no focal neurological deficits  SKIN: warm, dry, no rash. GEN: Patient awake alert NAD.   HEENT: normocephalic, atraumatic, nonerythematous oropharynx, EOMI, no scleral icterus, moist MM  CARDIAC: RRR, S1, S2, no murmur.   PULM: CTA B/L no wheeze, rhonchi, rales.   ABD: soft NT, ND, no rebound no guarding, no CVA tenderness.   MSK: Moving all extremities, no edema. 5/5 strength and full ROM in all extremities.     NEURO: A&Ox3, no focal neurological deficits  SKIN: warm, dry, no rash.

## 2025-03-03 PROBLEM — E11.9 TYPE 2 DIABETES MELLITUS WITHOUT COMPLICATIONS: Chronic | Status: ACTIVE | Noted: 2023-04-24
